# Patient Record
Sex: FEMALE | Race: WHITE | NOT HISPANIC OR LATINO | Employment: FULL TIME
[De-identification: names, ages, dates, MRNs, and addresses within clinical notes are randomized per-mention and may not be internally consistent; named-entity substitution may affect disease eponyms.]

---

## 2023-12-07 ENCOUNTER — TRANSCRIBE ORDERS (OUTPATIENT)
Dept: OTHER | Age: 28
End: 2023-12-07

## 2023-12-07 DIAGNOSIS — Q96.3 MOSAIC TURNER SYNDROME: Primary | ICD-10-CM

## 2023-12-21 ENCOUNTER — TELEPHONE (OUTPATIENT)
Dept: ENDOCRINOLOGY | Facility: CLINIC | Age: 28
End: 2023-12-21

## 2023-12-21 NOTE — TELEPHONE ENCOUNTER
----- Message from Edna Roberts RN sent at 12/18/2023  4:13 PM CST -----  Please schedule new patient visit with Sherrell and genetic counselor.  Thanks  Edna  ----- Message -----  From: Lay Simmons GC  Sent: 12/15/2023  11:32 AM CST  To: Edna Roberts RN; Salma Navarro GC; #    Just reviewed with Aaliyah - this should be a new patient visit with Sherrell and a GC only on the same day (this will likely be Tahmina). She gave me Edna's name. Edna - are you the right point of contact for scheduling for [facility] Salinas syndrome clinic?    Thanks!  ----- Message -----  From: Michaelle Bass  Sent: 12/15/2023  11:21 AM CST  To: Lay Simmons GC; #    Just forwarded the records to you!    Michaelle  ----- Message -----  From: Lay Simmons GC  Sent: 12/15/2023   8:41 AM CST  To: Salma Navarro GC; Michaelle Bass    We really need some type of records to make a decision about this one. Does patient have mosaic salinas syndrome? Do we want to do testing because we are suspicious they may have mosaic TS? Was it a finding on some type of prenatal testing for a baby? I am not seeing any records. Could you call them back and ask them to fax records/more info to us?  ----- Message -----  From: Mihcaelle Bass  Sent: 12/13/2023   1:28 PM CST  To: Lay Simmons GC; #    Hello! I received a voicemail from a GC at [facility] regarding a referral that she sent for this patient last week to be seen in the [facility] clinic for Salinas's Syndrome. It looks like the referral is in Epic but was canceled (by someone from [facility] scheduling, I think?) and never addressed. I'm guessing this would go to [facility]'s adult clinic but want to run it by you two just to confirm. Also, do you remember if I was Meryl from [facility] who had been scheduling these patients?    Thank you!    Michaelle

## 2024-02-16 ENCOUNTER — LAB (OUTPATIENT)
Dept: LAB | Facility: CLINIC | Age: 29
End: 2024-02-16
Attending: PEDIATRICS
Payer: COMMERCIAL

## 2024-02-16 ENCOUNTER — OFFICE VISIT (OUTPATIENT)
Dept: ENDOCRINOLOGY | Facility: CLINIC | Age: 29
End: 2024-02-16
Attending: PEDIATRICS
Payer: COMMERCIAL

## 2024-02-16 ENCOUNTER — OFFICE VISIT (OUTPATIENT)
Dept: ENDOCRINOLOGY | Facility: CLINIC | Age: 29
End: 2024-02-16
Attending: GENETIC COUNSELOR, MS
Payer: COMMERCIAL

## 2024-02-16 VITALS
HEIGHT: 63 IN | SYSTOLIC BLOOD PRESSURE: 135 MMHG | BODY MASS INDEX: 28.53 KG/M2 | DIASTOLIC BLOOD PRESSURE: 89 MMHG | WEIGHT: 161 LBS | HEART RATE: 108 BPM

## 2024-02-16 VITALS
HEART RATE: 108 BPM | BODY MASS INDEX: 28.53 KG/M2 | WEIGHT: 161 LBS | HEIGHT: 63 IN | SYSTOLIC BLOOD PRESSURE: 135 MMHG | DIASTOLIC BLOOD PRESSURE: 89 MMHG

## 2024-02-16 DIAGNOSIS — Z71.83 ENCOUNTER FOR NONPROCREATIVE GENETIC COUNSELING: ICD-10-CM

## 2024-02-16 DIAGNOSIS — Q96.3 MOSAIC TURNER SYNDROME: Primary | ICD-10-CM

## 2024-02-16 DIAGNOSIS — Q99.9 MOSAICISM: Primary | ICD-10-CM

## 2024-02-16 DIAGNOSIS — Q99.9 MOSAICISM: ICD-10-CM

## 2024-02-16 DIAGNOSIS — Q99.8 SEX CHROMOSOME ANOMALY, MOSAIC: ICD-10-CM

## 2024-02-16 DIAGNOSIS — Z31.5 ENCOUNTER FOR PROCREATIVE GENETIC COUNSELING: ICD-10-CM

## 2024-02-16 DIAGNOSIS — Q97.0 XXX SYNDROME: ICD-10-CM

## 2024-02-16 LAB
ALBUMIN SERPL BCG-MCNC: 4.9 G/DL (ref 3.5–5.2)
ALP SERPL-CCNC: 76 U/L (ref 40–150)
ALT SERPL W P-5'-P-CCNC: 23 U/L (ref 0–50)
ANION GAP SERPL CALCULATED.3IONS-SCNC: 11 MMOL/L (ref 7–15)
AST SERPL W P-5'-P-CCNC: 23 U/L (ref 0–45)
BILIRUB SERPL-MCNC: 0.2 MG/DL
BUN SERPL-MCNC: 12.4 MG/DL (ref 6–20)
CALCIUM SERPL-MCNC: 9.8 MG/DL (ref 8.6–10)
CHLORIDE SERPL-SCNC: 102 MMOL/L (ref 98–107)
CREAT SERPL-MCNC: 0.69 MG/DL (ref 0.51–0.95)
DEPRECATED HCO3 PLAS-SCNC: 27 MMOL/L (ref 22–29)
EGFRCR SERPLBLD CKD-EPI 2021: >90 ML/MIN/1.73M2
GLUCOSE SERPL-MCNC: 92 MG/DL (ref 70–99)
POTASSIUM SERPL-SCNC: 3.9 MMOL/L (ref 3.4–5.3)
PROT SERPL-MCNC: 8.3 G/DL (ref 6.4–8.3)
SODIUM SERPL-SCNC: 140 MMOL/L (ref 135–145)
T4 FREE SERPL-MCNC: 1.11 NG/DL (ref 0.9–1.7)

## 2024-02-16 PROCEDURE — 99417 PROLNG OP E/M EACH 15 MIN: CPT | Performed by: PEDIATRICS

## 2024-02-16 PROCEDURE — 83520 IMMUNOASSAY QUANT NOS NONAB: CPT

## 2024-02-16 PROCEDURE — 96040 HC GENETIC COUNSELING, EACH 30 MINUTES: CPT | Performed by: GENETIC COUNSELOR, MS

## 2024-02-16 PROCEDURE — 82166 ASSAY ANTI-MULLERIAN HORM: CPT

## 2024-02-16 PROCEDURE — 86364 TISS TRNSGLTMNASE EA IG CLAS: CPT

## 2024-02-16 PROCEDURE — 83001 ASSAY OF GONADOTROPIN (FSH): CPT

## 2024-02-16 PROCEDURE — 82306 VITAMIN D 25 HYDROXY: CPT

## 2024-02-16 PROCEDURE — 83002 ASSAY OF GONADOTROPIN (LH): CPT

## 2024-02-16 PROCEDURE — 36415 COLL VENOUS BLD VENIPUNCTURE: CPT

## 2024-02-16 PROCEDURE — 99245 OFF/OP CONSLTJ NEW/EST HI 55: CPT | Performed by: PEDIATRICS

## 2024-02-16 PROCEDURE — 80053 COMPREHEN METABOLIC PANEL: CPT

## 2024-02-16 PROCEDURE — 84439 ASSAY OF FREE THYROXINE: CPT

## 2024-02-16 PROCEDURE — 82670 ASSAY OF TOTAL ESTRADIOL: CPT

## 2024-02-16 PROCEDURE — 99213 OFFICE O/P EST LOW 20 MIN: CPT | Performed by: PEDIATRICS

## 2024-02-16 RX ORDER — HYDROXYZINE HYDROCHLORIDE 25 MG/1
25-50 TABLET, FILM COATED ORAL
COMMUNITY
Start: 2023-09-29

## 2024-02-16 RX ORDER — ACETAMINOPHEN AND CODEINE PHOSPHATE 120; 12 MG/5ML; MG/5ML
1 SOLUTION ORAL DAILY
COMMUNITY
Start: 2023-08-03

## 2024-02-16 RX ORDER — ESCITALOPRAM OXALATE 10 MG/1
10 TABLET ORAL DAILY
COMMUNITY
Start: 2023-06-15 | End: 2024-06-14

## 2024-02-16 RX ORDER — SERTRALINE HYDROCHLORIDE 100 MG/1
1 TABLET, FILM COATED ORAL
COMMUNITY
Start: 2023-03-31

## 2024-02-16 NOTE — PROGRESS NOTES
Pediatric Endocrinology Initial Consultation    Patient: Jana Membreno MRN# [id number]   YOB: 1995 Age: 29year 1month old   Date of Visit: Feb 16, 2024    Dear  Provider Not In System:    I had the pleasure of seeing your patient, Jana Membreno in the Pediatric Endocrinology Clinic, [facility], on Feb 16, 2024 for initial consultation regarding  .           Problem list:   There are no problems to display for this patient.           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  Jana was referred for additional discussion and management of this condition.  Jana was accompanied by her  Domenico at today's appointment.     She was healthy in infancy and childhood.  She reports typical pubertal progression with menarche around age 13.  Jana has regular periods both on no contraception and on a progesterone only birth control pill.  Jana does have a history of migraines with aura.  She reports increased eye pressure which is monitored with an ophthalmologist.     During carrier screening there was  an incidental finding of a possible loss of X-chromosome material.  This prompted the recommendation for a karyotype which returned confirming a diagnosis of mosaic Colin / triple X syndrome: 45,X[17]/47,XXX[5].  This result showed that in the 22 cells analyzed by blood sample, 17 had a 45,X chromosome pattern and five had a 47,XXX chromosome pattern.  However, this percentage of each cell type in the blood may not be representative of the percentage in other cell or tissue types.  Since the return of this result Jana had a cardiology evaluation last month and had a normal EKG.  A cardiac MRI is planned.     Dietary History:  Balanced    I have reviewed the available past laboratory evaluations, imaging studies, and medical records  available to me at this visit. I have reviewed the Jana's growth chart.    History was obtained from patient     Birth History:   Gestational age: Full term  Mode of delivery:   Complications during pregnancy   Birth weight 7 lbs   course: Uncomplicated             Past Medical History:   No past medical history on file.         Past Surgical History:   No past surgical history on file.            Social History:     She is a  and  is a  teacher.       Family History:   Menarche at 13 years of life.   Mother is 5 feet 5 and father is 5 feet 9 inches.She has a 32 year old brother who is healthy.  Family History:  A detailed pedigree was obtained and scanned into the electronic medical record.  It is significant for the following:   Jana does not currently have children and has never tried to become pregnant.   Jana has a 32-year-old brother who is healthy.  He has no children by choice.   Jana's mother is 62-years-old and generally healthy.    Jana's maternal grandmother  in her 70s of an aneurysm,  She also had a history of ovarian cancer.  We discussed today that while cancer is unfortunately common, ovarian cancer is less common and more likely than many other types of cancer to be associated with a hereditary cancer syndrome.  Jana reports that her mother had negative BRCA1/2 genetic testing but did still have a prophylactic hysterectomy/oophorectomy.  I would encourage Jana to ensure her primary care provider/OBGYN is aware of this family history.   Jana's father is 61-years-old and healthy.    Jana is of Polish and Ashkenazi Methodist ancestry on her maternal side and Maltese, Ashkenazi Methodist, and Sephardic Methodist on her paternal side.  Consanguinity was denied.       No family history on file.    History of:  Adrenal insufficiency: none.  Autoimmune disease: none.  Calcium problems: none.  Delayed puberty: none.  Diabetes mellitus:  "none.  Early puberty: none.  Genetic disease: none.  Short stature: none.  Thyroid disease: none.         Allergies:     Allergies   Allergen Reactions    Kiwi Extract Other (See Comments)     Kiwi, pine apple, peach: Itching tongue.             Medications:     Current Outpatient Medications   Medication Sig Dispense Refill    escitalopram (LEXAPRO) 10 MG tablet Take 10 mg by mouth daily      hydrOXYzine HCl (ATARAX) 25 MG tablet Take 25-50 mg by mouth      norethindrone (MICRONOR) 0.35 MG tablet Take 1 tablet by mouth daily      sertraline (ZOLOFT) 100 MG tablet Take 1 tablet by mouth daily at 2 pm               Review of Systems:   Gen: Negative  Eye: Negative  ENT: Negative  Pulmonary:  Negative  Cardio: Negative  Gastrointestinal: Negative  Hematologic: Negative  Genitourinary: Negative  Musculoskeletal: Negative  Psychiatric: Negative  Neurologic: Negative  Skin: Negative  Endocrine: see HPI.            Physical Exam:   Blood pressure 135/89, pulse 108, height 1.594 m (5' 2.76\"), weight 73 kg (161 lb), last menstrual period 02/05/2024.  Growth %ile SmartLinks can only be used for patients less than 20 years old.  Height: 159.4 cm  (62.76\") Facility age limit for growth %thi is 20 years.  Weight: 73 kg (actual weight), Facility age limit for growth %thi is 20 years.  BMI: Body mass index is 28.74 kg/m . Facility age limit for growth %thi is 20 years.      Constitutional: awake, alert, cooperative, no apparent distress  Eyes: Lids and lashes normal, sclera clear, conjunctiva normal  ENT: Normocephalic, without obvious abnormality, external ears without lesions,   Neck: Supple, symmetrical, trachea midline, thyroid symmetric, not enlarged and no tenderness  Hematologic / Lymphatic: no cervical lymphadenopathy  Lungs: No increased work of breathing, clear to auscultation bilaterally with good air entry.  Cardiovascular: Regular rate and rhythm, no murmurs.  Abdomen: No scars, normal bowel sounds, soft, " non-distended, non-tender, no masses palpated, no hepatosplenomegaly  Genitourinary:  Breasts : Luis E stage V  Musculoskeletal: There is no redness, warmth, or swelling of the joints.    Neurologic: Awake, alert, oriented to name, place and time.  Neuropsychiatric: normal  Skin: no lesions          Laboratory results:     Component      Latest Ref Rng 2/16/2024  3:15 PM   Sodium      135 - 145 mmol/L 140    Potassium      3.4 - 5.3 mmol/L 3.9    Carbon Dioxide (CO2)      22 - 29 mmol/L 27    Anion Gap      7 - 15 mmol/L 11    Urea Nitrogen      6.0 - 20.0 mg/dL 12.4    Creatinine      0.51 - 0.95 mg/dL 0.69    GFR Estimate      >60 mL/min/1.73m2 >90    Calcium      8.6 - 10.0 mg/dL 9.8    Chloride      98 - 107 mmol/L 102    Glucose      70 - 99 mg/dL 92    Alkaline Phosphatase      40 - 150 U/L 76    AST      0 - 45 U/L 23    ALT      0 - 50 U/L 23    Protein Total      6.4 - 8.3 g/dL 8.3    Albumin      3.5 - 5.2 g/dL 4.9    Bilirubin Total      <=1.2 mg/dL 0.2    25 OH Vit D2      ug/L <5    25 OH Vit D3      ug/L 28    25 OH Vit D total      20 - 75 ug/L <33    Tissue Transglutaminase Antibody IgA      <7.0 U/mL 0.5    Tissue Transglutaminase Chula IgG      <7.0 U/mL 0.6    Luteinizing Hormone      mIU/mL 8.5    FSH      mIU/mL 4.4    Estradiol      pg/mL 61    Anti-Mullerian Hormone      0.580 - 8.100 ng/mL 1.660    Inhibin B      8 - 223 pg/mL 109    T4 Free      0.90 - 1.70 ng/dL 1.11    Study Result    Narrative & Impression   EXAM: US RENAL COMPLETE NON-VASCULAR  LOCATION: [facility]  DATE: 2/23/2024     INDICATION:  Mosaicism  COMPARISON: None.  TECHNIQUE: Routine Bilateral Renal and Bladder Ultrasound.     FINDINGS:     RIGHT KIDNEY: 11.0 cm. Normal without hydronephrosis or masses.      LEFT KIDNEY: 11.4 cm. Normal without hydronephrosis or masses.      BLADDER: Unremarkable in appearance. Prevoid volume of 99 mL.                                                                       IMPRESSION:  1.  Normal kidney ultrasound.              Assessment and Plan:      29 year old with mosaic Colin syndrome. During her visit the following tests were requested:  Orders Placed This Encounter   Procedures    US Renal Complete Non-Vascular    Luteinizing Hormone    Follicle stimulating hormone    Estradiol    Anti-Mullerian hormone    Inhibin B       Addendum: Review of her labs showed normal ovarian function and intact hypothalamic-pituitary-ovarian axis. Her celiac panel was negative. Her thyroid function tests were normal. Her renal US was normal. Her vitamin D was borderline low and supplementation with 1000 units of cholecalciferol( Vitamin D) will be recommended.    Thank you for allowing me to participate in the care of your patient.  Please do not hesitate to call with questions or concerns.    Sincerely,  I have reviewed patient's past medical history, family history, social history, medications and allergies as documented in the electronic medical record.  There were no additional findings except as noted.     I spent 70 minutes of total time, before, during, and after the visit reviewing and interpreting previous labs and records, examining the patient, formulating and discussing the plan of care, ordering  Labs, reviewing resulted labs, and documenting clinical information in the electronic health record.    It is our pleasure to be involved in Jana Membreno [facility]. If you or the family has questions or concerns regarding these test results, please feel free to contact us via our Access Center at [phone].       Sincerely,       Monty Kerr MD     Dept. of Pediatrics - Divisions of Endocrinology and Genetics & Metabolism  Dept. of Experimental & Clinical Pharmacology  [address] [address]  Ph: [phone]  Email: [email or web address]             CC  No care team member to  display  PROVIDER NOT IN SYSTEM    Copy to patient     [address]  [facility] [address]

## 2024-02-16 NOTE — PROGRESS NOTES
Chief Complaint   Patient presents with    [facility]     [facility]    Elza Crystal LPN

## 2024-02-16 NOTE — PROGRESS NOTES
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  Jana was referred to myself and Dr. Monty Kerr for additional discussion and management of this condition.  During my visit with Jana a medical and family history was collected, Jana's karyotype was reviewed in detail, and reproductive information related to this result was discussed.  Jana was accompanied by her  Domenico at today's appointment.     Personal History:  Jana was born at term by .  She was healthy in infancy and childhood.  She reports typical pubertal progression with her first period around age 13.  Jana has regular periods both on no contraception and on a progesterone only birth control pill.  Jana does have a history of migraines with aura.  She reports increased eye pressure which is monitored with an ophthalmologist.  Jana has a master's degree and works as a .      In preparation to start a family in the next several years, Jana sought genetic counseling to pursue carrier screening.  This carrier screen returned with an incidental finding of a possible loss of X-chromosome material.  This prompted the recommendation for a karyotype which returned confirming a diagnosis of mosaic Colin / triple X syndrome: 45,X[17]/47,XXX[5].  This result showed that in the 22 cells analyzed by blood sample, 17 had a 45,X chromosome pattern and five had a 47,XXX chromosome pattern.  However, this percentage of each cell type in the blood may not be representative of the percentage in other cell or tissue types.  Since the return of this result Jana had a cardiology evaluation last month and had a normal EKG.  A cardiac MRI is planned.     Family History:  A detailed pedigree was obtained and scanned into the electronic medical record.  It is significant for the following:   Jana does not  currently have children and has never tried to become pregnant.   Jana has a 32-year-old brother who is healthy.  He has no children by choice.   Jana's mother is 62-years-old and generally healthy.    Jana's maternal grandmother  in her 70s of an aneurysm,  She also had a history of ovarian cancer.  We discussed today that while cancer is unfortunately common, ovarian cancer is less common and more likely than many other types of cancer to be associated with a hereditary cancer syndrome.  Jana reports that her mother had negative BRCA1/2 genetic testing but did still have a prophylactic hysterectomy/oophorectomy.  I would encourage Jana to ensure her primary care provider/OBGYN is aware of this family history.   Jana's father is 61-years-old and healthy.    Jana is of Polish and Ashkenazi Druze ancestry on her maternal side and Montenegrin, Ashkenazi Druze, and Sephardic Druze on her paternal side.  Consanguinity was denied.     Discussion:  Today we reviewed that genes are long stretches of DNA that are responsible for how our bodies look and how our bodies work.  Our genes are inherited on structures called chromosomes of which we have 23 pairs.  The first 22 pairs are the same in males and females while the 23rd pair, the sex chromosomes (X and Y), are different in males and females.  This results in a total of 46 chromosomes in each cell of the body.  Males most commonly have one copy of the X-chromosome and one copy of the Y-chromosome (designated 46,XY) while females most commonly have two copies of the X-chromosome (designated 46,XX).     Colin syndrome is a genetic condition caused by the presence of a single X-chromosome.  This is most commonly due to the absence of the X-chromosome in the egg or sperm prior to conception resulting in 45 total chromosomes designated 45,X.  However, other individuals like Jana have mosaicism meaning some of the cells are 45,X while some  have either the usual 46,XX configuration or an extra copy of the X-chromosome designated 47,XXX.  This is likely due to a non-disjunction event early on in embryonic or fetal development.      Signs and symptoms of Colin syndrome are variable but often include shorter than average stature, a broad or webbed neck, and distinctive facial features.  Most women with Colin syndrome have reduced fertility due to ovarian insufficiency and most do not have spontaneous pubertal development or menarche.  Heart defects are common.  Women with Colin syndrome also have an increased chance for kidney defects, hearing loss, skeletal differences, thyroid disease, and learning delays (typically mild).  Women with mosaic Colin syndrome are likely at a lower risk for these complications but because the percentage of Colin vs XXX cells in all tissue types cannot be accurately predicted by the percentage in the blood, we do still recommend at least initial screening for all of the potential complications of Colin syndrome.     Women with complete Colin syndrome most often have infertility.  However, women with mosaic Colin syndrome as well as those with spontaneous menarche are less likely to have infertility.  Once recent study (Saud et al., 2019) showed that in an unselected cohort, women with mosaic Colin syndrome appear to have normal or near-normal fertility.  However, those with a 47,XXX cell line may have an earlier age of menopause by approximately 5 years.  Endocrine labs to help assess ovarian function and reserve including FSH and AMH are recommended today.  If concerns are present, or if the couple does have trouble conceiving in the future, reproductive technologies are available. Because of the possible increased risk for premature ovarian failure, egg retrieval and egg/embryo storage may also be an option.      If conception is able to occur naturally, women with full Colin syndrome do appear to have an  increased risk for miscarriage of between 30-50%.  Possible reasons for this increase include poor oocyte quality, fetal chromosome anomalies, smaller uterus, and an increased risk for maternal auto-immune conditions.  There are mixed reports about the risk of fetal chromosome anomalies, with some studies showing an estimated 30% overall risk.  To address these risks prenatal screening and testing are options.  Non-invasive screening options include cell-free fetal DNA screening.  Diagnostic options include chorionic villus sampling (CVS) and amniocentesis.  Maternal health complications such as hypertension, diabetes, and aortic dissection may also be increased.  However, just like the fertility challenges noted above, it is unclear how significant these risks are in women with mosaic Colin syndrome, but they are likely lower than those reported for full Colin syndrome.  Regardless, additional monitoring and screening for maternal and fetal complications should be offered.    Features associated with 47,XXX are typically more subtle than those seen in Colin syndrome.  The most common features seen are genitourinary abnormalities like unilateral kidney and renal dysplasia. Congenital heart defects and seizures have also been described.  Women with complete triple X syndrome do appear to have a higher chance for mild developmental delays. ADHD, and behavioral, social, and emotional deficits.  As noted above, there may be some risk for earlier than average menopause.  Like the features associated with Colin syndrome, these symptoms seem to be less likely or milder in individuals mosaic for triple X syndrome.     At the conclusion of our visit Jana and her  Domenico expressed understanding of the information discussed and had no further questions.  My direct contact information was shared should any arise.  I wish Jana and Domenico the very best as they continue to plan their family.      Plan:  1.   Endocrine labs and a kidney ultrasaound were recommended by Dr. Kerr today   2.  Follow-up as recommended by Dr. Kerr   3.  I remain available for additional questions or concerns       Tahmina Dimas [facility]  Genetic Counselor  Division of Genetics and Metabolism    Total time spent in consultation with the family was approximately 45 minutes

## 2024-02-16 NOTE — PATIENT INSTRUCTIONS
Thank you for trusting us with your care!     If you need to contact us for questions about:  Symptoms, Scheduling & Medical Questions; Non-urgent (2-3 day response) Dena message, Urgent (needing response today) [phone] (if after 3:30pm next day response)   Prescriptions: Please call your Pharmacy   Billing: Kayli [phone] or [facility]:[phone]    no change

## 2024-02-17 LAB
ESTRADIOL SERPL-MCNC: 61 PG/ML
FSH SERPL IRP2-ACNC: 4.4 MIU/ML
LH SERPL-ACNC: 8.5 MIU/ML
MIS SERPL-MCNC: 1.66 NG/ML (ref 0.58–8.1)

## 2024-02-18 LAB — INHIBIN B SERPL-MCNC: 109 PG/ML

## 2024-02-19 LAB
TTG IGA SER-ACNC: 0.5 U/ML
TTG IGG SER-ACNC: 0.6 U/ML

## 2024-02-20 LAB
DEPRECATED CALCIDIOL+CALCIFEROL SERPL-MC: <33 UG/L (ref 20–75)
VITAMIN D2 SERPL-MCNC: <5 UG/L
VITAMIN D3 SERPL-MCNC: 28 UG/L

## 2024-02-23 ENCOUNTER — HOSPITAL ENCOUNTER (OUTPATIENT)
Dept: ULTRASOUND IMAGING | Facility: HOSPITAL | Age: 29
Discharge: HOME | End: 2024-02-23
Attending: PEDIATRICS | Admitting: PEDIATRICS
Payer: COMMERCIAL

## 2024-02-23 DIAGNOSIS — Q99.9 MOSAICISM: ICD-10-CM

## 2024-02-23 PROCEDURE — 76770 US EXAM ABDO BACK WALL COMP: CPT

## 2024-07-28 ENCOUNTER — HEALTH MAINTENANCE LETTER (OUTPATIENT)
Age: 29
End: 2024-07-28

## 2025-08-10 ENCOUNTER — HEALTH MAINTENANCE LETTER (OUTPATIENT)
Age: 30
End: 2025-08-10